# Patient Record
Sex: MALE | Race: WHITE
[De-identification: names, ages, dates, MRNs, and addresses within clinical notes are randomized per-mention and may not be internally consistent; named-entity substitution may affect disease eponyms.]

---

## 2018-03-19 ENCOUNTER — HOSPITAL ENCOUNTER (INPATIENT)
Dept: HOSPITAL 36 - ER | Age: 78
LOS: 2 days | Discharge: HOME | DRG: 312 | End: 2018-03-21
Attending: FAMILY MEDICINE | Admitting: FAMILY MEDICINE
Payer: COMMERCIAL

## 2018-03-19 VITALS — DIASTOLIC BLOOD PRESSURE: 84 MMHG | SYSTOLIC BLOOD PRESSURE: 156 MMHG

## 2018-03-19 DIAGNOSIS — I25.10: ICD-10-CM

## 2018-03-19 DIAGNOSIS — I10: ICD-10-CM

## 2018-03-19 DIAGNOSIS — Z95.0: ICD-10-CM

## 2018-03-19 DIAGNOSIS — F32.9: ICD-10-CM

## 2018-03-19 DIAGNOSIS — K21.9: ICD-10-CM

## 2018-03-19 DIAGNOSIS — E78.5: ICD-10-CM

## 2018-03-19 DIAGNOSIS — R42: ICD-10-CM

## 2018-03-19 DIAGNOSIS — D72.829: ICD-10-CM

## 2018-03-19 DIAGNOSIS — I25.119: ICD-10-CM

## 2018-03-19 DIAGNOSIS — R55: Primary | ICD-10-CM

## 2018-03-19 DIAGNOSIS — I25.2: ICD-10-CM

## 2018-03-19 LAB
ALBUMIN SERPL-MCNC: 4.6 GM/DL (ref 4.2–5.5)
ALBUMIN/GLOB SERPL: 2 {RATIO} (ref 1–1.8)
ALP SERPL-CCNC: 68 U/L (ref 34–104)
ALT SERPL-CCNC: 7 U/L (ref 7–52)
ANION GAP SERPL CALC-SCNC: 9.7 MMOL/L (ref 7–16)
AST SERPL-CCNC: 17 U/L (ref 13–39)
BILIRUB SERPL-MCNC: 0.6 MG/DL (ref 0.3–1)
BUN SERPL-MCNC: 18 MG/DL (ref 7–25)
CALCIUM SERPL-MCNC: 10.3 MG/DL (ref 8.6–10.3)
CHLORIDE SERPL-SCNC: 108 MEQ/L (ref 98–107)
CO2 SERPL-SCNC: 26.2 MEQ/L (ref 21–31)
CREAT SERPL-MCNC: 1 MG/DL (ref 0.7–1.3)
EOSINOPHIL NFR BLD: 0 % (ref 0–5)
ERYTHROCYTE [DISTWIDTH] IN BLOOD BY AUTOMATED COUNT: 12.5 % (ref 11.5–20)
GLOBULIN SER-MCNC: 2.3 GM/DL
GLUCOSE SERPL-MCNC: 90 MG/DL (ref 70–105)
HCT VFR BLD CALC: 46.3 % (ref 41–60)
HGB BLD-MCNC: 15.5 GM/DL (ref 12–16)
LYMPHOCYTES # BLD MANUAL: 26 % (ref 20–50)
MANUAL DIFFERENTIAL PERFORMED BLD QL: YES
MCH RBC QN AUTO: 28.5 PG (ref 27–31)
MCHC RBC AUTO-ENTMCNC: 33.4 PG (ref 28–36)
MCV RBC AUTO: 85.4 FL (ref 80–99)
MONOCYTES # BLD MANUAL: 2 % (ref 2–10)
NEUTROPHILS NFR BLD AUTO: 41 % (ref 40–80)
NEUTS BAND NFR BLD: 4 % (ref 0–10)
PLATELET # BLD: 159 TH/CMM (ref 150–400)
PMV BLD AUTO: 8.7 FL
POTASSIUM SERPL-SCNC: 3.9 MEQ/L (ref 3.5–5.1)
RBC # BLD AUTO: 5.42 MIL/CMM (ref 3.8–5.8)
SODIUM SERPL-SCNC: 140 MEQ/L (ref 136–145)
TOTAL CELLS COUNTED BLD: 100
VARIANT LYMPHS NFR BLD MANUAL: 27 %
WBC # BLD AUTO: 12.2 TH/CMM (ref 4.8–10.8)

## 2018-03-19 PROCEDURE — Z7610: HCPCS

## 2018-03-19 NOTE — ED PHYSICIAN CHART
ED Chief Complaint/HPI





- Patient Information


Date Seen:: 03/19/18


Time Seen:: 12:13


Chief Complaint:: EPISODE OF DIZZINESS APPROXIMATELY 45 MINUTES PTA.


History of Present Illness:: 





THE 77-YEAR-OLD MALE PRESENTS WITH A DIZZY EPISODE THAT OCCURRED APPROXIMATELY 

45 MINUTES PRIOR TO ARRIVAL WHILE HE WAS DRIVING FROM [employer].  He 

describes the episode as difficulty with alertness and describes it as feeling 

as though is fading in and out.  The patient has had a pacemaker for 2 years 

following a myocardial infarction and bradycardic episodes.  Patient has had no 

chest pain today, no diaphoresis and mild respiratory distress.  He denies any 

nausea, vomiting, diaphoresis, chest pain or abdominal pain.


Historian:: Patient


Review:: Nurse's Note Reviewed ( THE NURSES TRIAGE SHEET WAS THE SOURCE OF THE 

REVIEWED NURSING NOTES.)





ED Review of Systems





- Review of Systems


General/Constitutional: No fever, No chills, Weight loss, No weakness ( VAGUE 

WEAKNESS.), No diaphoresis


Skin: No skin lesions


Head: No headache, Light headed ( THE PATIENT EXPERIENCE A LIGHTHEADEDNESS THAT 

HE FOUND DIFFICULT TO DESCRIBE.)


Eyes: No pain, No diplopia


ENT: No earache, No nasal drainage


Neck: No neck pain, No swelling, No thyromegaly, No stiffness, No mass noted


Cardio Vascular: No chest pain, No palpitations, No PND, No orthopnea, No edema


Pulmonary: SOB ( THE PATIENT HAD MILD RESPIRATORY DISTRESS AT THE TIME HE WAS 

EXPERIENCING THE NEAR SYNCOPAL SYMPTOMS.), No cough, No sputum, No wheezing


GI: No nausea, No vomiting, No diarrhea, No pain, No melena, No hematochezia, 

No constipation, No hematemesis


G/U: No dysuria, No hematuria, No nacturia


Musculoskeletal: No bone or joint pain, No back pain, No muscle pain


Endocrine: No polyuria, No polydipsia


Psychiatric: Prior psych history, No depression, Suicidal ideation, No 

homicidal ideation, No visual hallucination


Hematopoietic: No bruising, No lymphadenopathy


Allergic/Immuno: Urticaria


Neurological: No syncope, No focal symptoms, No weakness, No paresthesia, No 

headache, No seizure, Dizziness, Confusion, No vertigo





Family Medical History





- Family Member


  ** Mother


History Unknown: Yes


Hx Family Tuberculosis: No ( ALL CAPS NO FAMILY HISTORY OF TUBERCULOSIS 

HEPATITIS OR COPD.)





ED Physical Exam





- Physical Examination


General/Constitutional: Well-developed, well-nourished, Alert, No distress, Non-

toxic appearing, Ambulatory


Head: Atraumatic


Eyes: Lids, conjuctiva normal, PERRL, EOMI


Other Eyes comments:: 





NO NYSTAGMUS


Skin: Nl inspection, No rash, No skin lesions, No ecchymosis, Well hydrated, No 

lymphadenopathy


ENMT: External ears, nose nl, Nasal exam nl, Lips, teeth, gums nl, Oropharynx nl


Neck: Nontender, Full ROM w/o pain, No JVD, No nuchal rigidity, No bruit, No 

mass, No stridor


Other Neck comments:: 





Patient has a pacemaker in the left subclavian area with no signs of infection.


Respiratory: Nl effort/Exclusion, Clear to Auscultation, No Wheeze/Rhonchi/

Rales (ON oxygen saturation is greater than 95% on room air.)


Cardio Vascular: RRR, No murmur, gallop, rubs, NL S1 S2


Other Cardio Vascular comments:: 





The external pulses in all 4 extremities. PULSES IN ALL FOUR EXTREMITIES WERE 

NORMAL.


GI: No organomegaly, No hernia, Normal BS's, Nondistended, No mass/bruits, No 

McBurney tenderness


Other GI comments:: 





 RECKLESS EXAMINATION DEFERRED AT MY DISCRETION.


: No CVA tenderness, NL external genitalia, No discharge


Extremities: No tenderness or effusion, Full ROM, normal strength in all 

extremities, No edema, Normal digits & nails


Neuro/Psych: Alert/oriented, Normal sensory exam, Judgement/insight normal, 

Mood normal, Normal gait, No focal deficits


Other Neuro/Psych comments:: 





 ALL CAPS EXCELLENT MOTOR STRENGTH IN ALL FOUR EXTREMITIES. PATIENT IS X 

 FOR [employer] AND [employer].


 Laboratory Tests











  03/19/18 03/19/18 03/19/18





  12:10 12:10 12:10


 


WBC  12.2 H  


 


RBC  5.42  


 


Hgb  15.5  


 


Hct  46.3  


 


MCV  85.4  


 


MCH  28.5  


 


MCHC Differential  33.4  


 


RDW  12.5  


 


Plt Count  159  


 


MPV  8.7  


 


Neutrophils %  NP  


 


Band Neutrophils %  4  


 


Lymphocytes %  NP  


 


Monocytes %  NP  


 


Eosinophils %  NP  


 


Basophils %  NP  


 


Neutrophils (Manual)  41  


 


Lymphocytes  26  


 


Monocytes  2  


 


Eosinophils  0  


 


Atypical Lymphocytes  27  


 


Sodium   140 


 


Potassium   3.9 


 


Chloride   108 H 


 


Carbon Dioxide   26.2 


 


Anion Gap   9.7 


 


BUN   18 


 


Creatinine   1.0 


 


Est GFR ( Amer)   TNP 


 


Est GFR (Non-Af Amer)   TNP 


 


BUN/Creatinine Ratio   18.0 


 


Glucose   90 


 


Hemoglobin A1c %   


 


Whole Bld Lactic Acid   


 


Calcium   10.3 


 


Total Bilirubin   0.6 


 


AST   17 


 


ALT   7 


 


Alkaline Phosphatase   68 


 


Troponin I    < 0.01 L


 


Total Protein   6.9 


 


Albumin   4.6 


 


Globulin   2.3 


 


Albumin/Globulin Ratio   2.0 H 


 


Triglycerides   


 


Cholesterol   


 


LDL Cholesterol Direct   


 


HDL Cholesterol   


 


TSH   














  03/19/18 03/20/18 03/20/18





  12:10 05:34 05:35


 


WBC    9.8


 


RBC    5.03


 


Hgb    14.3


 


Hct    43.1


 


MCV    85.6


 


MCH    28.5


 


MCHC Differential    33.2


 


RDW    12.5


 


Plt Count    142 L


 


MPV    8.9


 


Neutrophils %    31.0 L


 


Band Neutrophils %   


 


Lymphocytes %    59.4 H


 


Monocytes %    7.1


 


Eosinophils %    2.3


 


Basophils %    0.2


 


Neutrophils (Manual)   


 


Lymphocytes   


 


Monocytes   


 


Eosinophils   


 


Atypical Lymphocytes   


 


Sodium   


 


Potassium   


 


Chloride   


 


Carbon Dioxide   


 


Anion Gap   


 


BUN   


 


Creatinine   


 


Est GFR ( Amer)   


 


Est GFR (Non-Af Amer)   


 


BUN/Creatinine Ratio   


 


Glucose   


 


Hemoglobin A1c %   


 


Whole Bld Lactic Acid  1.22  


 


Calcium   


 


Total Bilirubin   


 


AST   


 


ALT   


 


Alkaline Phosphatase   


 


Troponin I   < 0.01 L 


 


Total Protein   


 


Albumin   


 


Globulin   


 


Albumin/Globulin Ratio   


 


Triglycerides   


 


Cholesterol   


 


LDL Cholesterol Direct   


 


HDL Cholesterol   


 


TSH   














  03/20/18 03/20/18 03/20/18





  05:35 05:35 05:35


 


WBC   


 


RBC   


 


Hgb   


 


Hct   


 


MCV   


 


MCH   


 


MCHC Differential   


 


RDW   


 


Plt Count   


 


MPV   


 


Neutrophils %   


 


Band Neutrophils %   


 


Lymphocytes %   


 


Monocytes %   


 


Eosinophils %   


 


Basophils %   


 


Neutrophils (Manual)   


 


Lymphocytes   


 


Monocytes   


 


Eosinophils   


 


Atypical Lymphocytes   


 


Sodium  139  


 


Potassium  4.0  


 


Chloride  110 H  


 


Carbon Dioxide  25.0  


 


Anion Gap  8.0  


 


BUN  19  


 


Creatinine  0.9  


 


Est GFR ( Amer)  TNP  


 


Est GFR (Non-Af Amer)  TNP  


 


BUN/Creatinine Ratio  21.1  


 


Glucose  102  


 


Hemoglobin A1c %   5.9 


 


Whole Bld Lactic Acid   


 


Calcium  9.0  


 


Total Bilirubin  0.7  


 


AST  12 L  


 


ALT  6 L  


 


Alkaline Phosphatase  59  


 


Troponin I   


 


Total Protein  5.9 L  


 


Albumin  3.8 L  


 


Globulin  2.1  


 


Albumin/Globulin Ratio  1.8  


 


Triglycerides  98  


 


Cholesterol  128  


 


LDL Cholesterol Direct  98  


 


HDL Cholesterol  29  


 


TSH    2.34








 ON LABORATORY STUDIES THE [facility] SHOW A MILD LEUKOCYTOSIS WHICH IS PROBABLY NOT 

CLINICALLY SIGNIFICANT. THE HEMOGLOBIN OF 15.5 WAS WITHIN NORMAL LIMITS.





ED Labs/Radiology/EKG Results





- Lab Results


Results: 





 SINGLE VIEW CHEST X-RAY SHOWS NO CARDIOMEGALY OR CHF. NO AREAS OF PULMONARY 

CONSOLIDATION OR THE FREIGHT. NO NEW MODE THORAX OR PLEURAL EFFUSION. IMPRESSION

: NO ACUTE CARDIOPULMONARY FINDINGS.





ED Assessment





- Assessment


General Assessment: 





CASE   SUMMARY:   THE 77-YEAR-OLD MALE PRESENTED WITH NEAR SYNCOPAL SYMPTOMS ON 

THE DRIVE HERE FROM [place]. PATIENT HAD A PAST HISTORY OF A PACEMAKER 

FOLLOWING MYOCARDIAL INFARCTION TWO YEARS AGO. A REPRESENTATIVE OF THE 

PACEMAKER COMPANY RESPONDED TO THE EMERGENCY DEPARTMENT AND QUERIED THE 

PACEMAKER. THE PATIENT WAS HOVERING IN THE HIGH 50S TO LOW 60S AND THE 

RECOMMENDATION WAS MADE TO INCREASE THE THRESHOLD FOR PACING THE 60. PATIENT 

HAD A NEGATIVE TROPONIN AND IT SEEM PRUDENT TO ADMIT THE PATIENT FOR FURTHER 

MONITORING AND STUDIES OVER THE NEXT 24 HOURS. HE WAS ADMITTED IN STABLE 

CONDITIONE





[facility]  NO SEVERE ANEMIA BASED ON LABORATORY STUDIES.  NOT

  HYPOGLYCEMIA BASED ON LABORATORY STUDIES.  NOT   3RD HEART BLOCK BASED ON 

CARDIOGENIC MONITORING IN THE EMERGENCY DEPARTMENT.





ED Septic Shock





- .


Is Septic Shock (SBP<90, OR Lactate>4 mmol\L) present?: No





ED Reassessment (Disposition)





- Reassessment


Reassessment Condition:: Improved





- Diagnosis


Diagnosis:: 





SYNCOPAL EPISODE 





- Aftercare/Follow up Instructions


Aftercare/Follow-Up Instructions:: Counseled pt regarding lab results/diagnosis 

& need follow up





ED Discharge Plan





- Patient Disposition


Admit/Discharge/Transfer: Acute Care w/in this hosp


Condition at Disposition: Stable

## 2018-03-20 LAB
ALBUMIN SERPL-MCNC: 3.8 GM/DL (ref 4.2–5.5)
ALBUMIN/GLOB SERPL: 1.8 {RATIO} (ref 1–1.8)
ALP SERPL-CCNC: 59 U/L (ref 34–104)
ALT SERPL-CCNC: 6 U/L (ref 7–52)
ANION GAP SERPL CALC-SCNC: 8 MMOL/L (ref 7–16)
AST SERPL-CCNC: 12 U/L (ref 13–39)
BASOPHILS # BLD AUTO: 0 TH/CUMM (ref 0–0.2)
BASOPHILS NFR BLD AUTO: 0.2 % (ref 0–2)
BILIRUB SERPL-MCNC: 0.7 MG/DL (ref 0.3–1)
BUN SERPL-MCNC: 19 MG/DL (ref 7–25)
CALCIUM SERPL-MCNC: 9 MG/DL (ref 8.6–10.3)
CHLORIDE SERPL-SCNC: 110 MEQ/L (ref 98–107)
CHOLEST SERPL-MCNC: 128 MG/DL (ref ?–200)
CO2 SERPL-SCNC: 25 MEQ/L (ref 21–31)
CREAT SERPL-MCNC: 0.9 MG/DL (ref 0.7–1.3)
EOSINOPHIL # BLD AUTO: 0.2 TH/CMM (ref 0.1–0.4)
EOSINOPHIL NFR BLD AUTO: 2.3 % (ref 0–5)
ERYTHROCYTE [DISTWIDTH] IN BLOOD BY AUTOMATED COUNT: 12.5 % (ref 11.5–20)
GLOBULIN SER-MCNC: 2.1 GM/DL
GLUCOSE SERPL-MCNC: 102 MG/DL (ref 70–105)
HBA1C MFR BLD: 5.9 % (ref 4–6)
HCT VFR BLD CALC: 43.1 % (ref 41–60)
HDLC SERPL-MCNC: 29 MG/DL (ref 23–92)
HGB BLD-MCNC: 14.3 GM/DL (ref 12–16)
HGB BLD-MCNC: 16 G/DL (ref 4–35)
LYMPHOCYTE AB SER FC-ACNC: 5.9 TH/CMM (ref 1.5–3)
LYMPHOCYTES NFR BLD AUTO: 59.4 % (ref 20–50)
MCH RBC QN AUTO: 28.5 PG (ref 27–31)
MCHC RBC AUTO-ENTMCNC: 33.2 PG (ref 28–36)
MCV RBC AUTO: 85.6 FL (ref 80–99)
MONOCYTES # BLD AUTO: 0.7 TH/CMM (ref 0.3–1)
MONOCYTES NFR BLD AUTO: 7.1 % (ref 2–10)
NEUTROPHILS # BLD: 3 TH/CMM (ref 1.8–8)
NEUTROPHILS NFR BLD AUTO: 31 % (ref 40–80)
PLATELET # BLD: 142 TH/CMM (ref 150–400)
PMV BLD AUTO: 8.9 FL
POTASSIUM SERPL-SCNC: 4 MEQ/L (ref 3.5–5.1)
RBC # BLD AUTO: 5.03 MIL/CMM (ref 3.8–5.8)
SODIUM SERPL-SCNC: 139 MEQ/L (ref 136–145)
TRIGL SERPL-MCNC: 98 MG/DL (ref ?–150)
WBC # BLD AUTO: 9.8 TH/CMM (ref 4.8–10.8)

## 2018-03-20 RX ADMIN — PANTOPRAZOLE SODIUM SCH MG: 40 TABLET, DELAYED RELEASE ORAL at 08:54

## 2018-03-20 NOTE — DIAGNOSTIC IMAGING REPORT
Carotid ultrasound



HISTORY: Syncope



COMPARISON: None



Technique: Longitudinal and transverse sonographic sector images of the

carotid arteries were obtained with doppler analysis.



FINDINGS:



Exam of the right side demonstrates generalized atherosclerotic vascular

disease with mild to moderate plaque formation, greatest along the

carotid bulb.



Exam of left side demonstrates generalized atherosclerotic vascular

disease with mild to moderate atherosclerotic plaque formation.



The velocity and velocity ratios are within normal limits.  Antegrade

vertebral artery flow is demonstrated bilaterally.



IMPRESSION:



Mild to moderate bilateral atherosclerotic vascular disease, right

greater than left.  No evidence of hemodynamically significant stenosis.

## 2018-03-20 NOTE — CONSULTATION
DATE OF CONSULTATION:  03/20/2018



The patient of Dr. Ian Eldridge.



HISTORY OF PRESENT ILLNESS:  This is a 77-year-old male patient who was

traveling from [place] to [place].  The patient was near [place] when

the patient had been feeling dizziness, near syncope, weakness, and the patient

came to the Emergency Room and the patient is admitted.  No history of PND or

orthopnea.



PAST MEDICAL HISTORY:  Sick sinus syndrome with pacemaker, old myocardial

infarction, stable angina, hypertension, hyperlipidemia, GERD, and depression.



FAMILY HISTORY:  Unremarkable.



SOCIAL HISTORY:  No history of smoking, alcohol abuse.



ALLERGIES:  No known allergies.



PHYSICAL EXAMINATION:

VITAL SIGNS:  Blood pressure 130/80, pulse 70, and respirations 20.

HEAD:  Normocephalic.  No lumps or bumps.

EYES:  Pupils equal, reactive to light.  Fundi show AV nicking, sclerae white,

conjunctivae pink.

NECK:  Carotid 2+.  Normal upstroke.  JVD flat.  Thyroid not palpable.  Lymph

nodes not palpable.

CHEST:  Shows increased AP diameter.  No kyphosis, scoliosis.

LUNGS:  Bilateral bronchovesicular breath sounds.

HEART:  PMI fifth intercostal space with lateral to midclavicular line.  S1, S2.

 No S3, soft S4, soft systolic murmur.

ABDOMEN:  Soft.  Liver and spleen not palpable.  No organomegaly.  Bowel sounds

active.

NEUROLOGIC:  Unremarkable.

EXTREMITIES:  Peripheral pulses 2+.  No pedal edema.



CLINICAL IMPRESSION:  Near syncope, sick sinus syndrome with pacemaker, old

myocardial infarction, stable angina, hypertension, hyperlipidemia,

gastroesophageal reflux disease, and depression.



PLAN:  We will get EKG, troponin level, carotid duplex study, and

echocardiogram.





DD: 03/20/2018 20:35

DT: 03/20/2018 23:23

JOB# [id number]  [id number]

## 2018-03-20 NOTE — HISTORY AND PHYSICAL
History of Present Illness





- HPI


Chief Complaint: 





Episodes of dizziness, near-syncopal episode


HPI: 





76y/o male who presents to [facility] for an episode of 

feeling dizzy while driving from [place]. He describes the episode with 

difficulty remaining alert and the feeling of fading in and out of 

consciousness. Patient recently underwent placement of a cardiac pacemaker 2 

years ago following a myocardial infarction and bradycardic episode. Patient 

has had no chest pain or no diaphoresis or mild respiratory distress. He denies 

nausea or vomiting. 





Initial Labwork





WBC 12.2 H/H 15.5/46.5 platelet 159


Na 140 K3.9 Bun/Cr 18/1.0 Glu 90


troponin <0.01





Patient was subsequently admitted to a telemetry bed for further evaluation and 

treatment. 


Vital Signs: 





 Last Vital Signs











Temp  97.2 F   03/20/18 04:00


 


Pulse  72   03/20/18 04:00


 


Resp  18   03/20/18 04:00


 


BP  145/87   03/20/18 04:00


 


Pulse Ox  95   03/20/18 04:00














Past Medical History


Cardiovascular: Report: HTN, Hyperlipidemia, Other (h/o MI)


Pulmonary: Report: No Pertinent Hx


CNS: Report: No Pertinent Hx


GI: Report: GERD


Psych: Report: Depression


Musculoskeletal: Report: No Pertinent Hx


Rheumatologic: Report: No pertinent Hx


Infectious Disease: Report: No Pertinent Hx


Renal/: Report: No Pertinent Hx


Endocrine: Report: No Pertinent Hx


Dermatology: Report: No Pertinent Hx





- Past Surgical History


Past Surgical History: Other (s/p pacemaker placement)





Family Medical History





- Family Member


  ** Mother


History Unknown: Yes





Social History


Smoke: No


Alcohol: None


Drugs: None


Lives: With Family





- Medications


Home Medications: 


Home Medication











 Medication  Instructions  Recorded  Type


 


Losartan Potassium [Cozaar] 25 mg PO DAILY 03/19/18 History


 


Lovastatin 20 mg PO DAILY 03/19/18 History


 


Pantoprazole [Protonix] 40 mg PO DAILY 03/19/18 History


 


Sertraline [Zoloft] 25 mg PO DAILY 03/19/18 History














- Allergies


Allergies/Adverse Reactions: 


 Allergies











Allergy/AdvReac Type Severity Reaction Status Date / Time


 


amoxicillin Allergy   Verified 03/19/18 12:49


 


clavulanic acid Allergy   Verified 03/19/18 12:49





[From Augmentin]     














Review of Systems





- Review of Systems


Constitutional: Report: No Significant


Eyes: Report: No Significant


ENT: Report: No Significant


Respiratory: Report: No Significant


Cardiovascular: Report: No Significant


Gastrointestinal: Report: No Significant


Genitourinary: Report: No Significant


Musculoskeletal: Report: No Significant


Skin: Report: No Significant


Neurological: Report: No Significant





Physical Exam





- Physical Exam


HEENT: Report: Ears Nose Throat within normal limits, Pharnyx within normal 

limits


Neck: Report: Within normal limits


Cardiovascular Systems: Report: +s1/s2 noted, Regular, Rate and Rhythm


Respiratory: Report: Breath Sounds are within normal limits, Clear to 

Auscultation of lung fields


Abdomen: Report: Non-tender to palpation


Back: Report: Inspection of back is within normal limits.


Extremities: Report: Non-tender to palpation.


Skin: Report: Color of skin is within normal limits


Neuro/Psych: Report: Mood affect is within normal limits, A+Ox3





- Assessment


Assessment: 





 Current Active Problems











Problem Status Onset


 


DIZZINESS WITH PACEMAKER Acute  








Dizziness


Near Syncopal Episode


Coronary artery disease


Leukocytosis


H/O Myocardial Infacartion


Hypertension


GERD


Hyperlipidemia


Depression








- Plan


Plan: 





Admit to telemetry


cardiac consult -- Dr. Medina


CBC,CMP,lipid profile, TSH, HgA1c, troponin


carotid US


continue home meds

## 2018-03-21 RX ADMIN — PANTOPRAZOLE SODIUM SCH MG: 40 TABLET, DELAYED RELEASE ORAL at 08:41

## 2018-03-21 NOTE — CARDIOLOGY
03/20/2018



ECHOCARDIOGRAM REPORT



M-MODE ECHOCARDIOGRAM:  Mitral valve:  Anterior leaflet of mitral valve shows

normal excursion, EF velocity.  Posterior leaflet of the mitral valve shows

normal excursion.  Left ventricular posterior wall shows increased thickness,

normal excursion.  Interventricular septum shows increased thickness, normal

excursion, hypertrophy of the left ventricle, ejection fraction 60%.  Left

atrium normal.  Aortic root shows normal dimension.  Normal excursion of aortic

leaflets.



CONCLUSION:  Hypertrophy of the left ventricle, ejection fraction 60%.



TWO-D ECHO:  Long axis view showed normal sized left ventricle with hypertrophy

of the left ventricle.  Left atrium normal.  Aortic root shows normal dimension.

 Normal excursion of aortic leaflets.  Short axis mitral valve normal.  Short

axis view of aortic valve normal.  Apical four chamber view showed normal sized

left ventricle with hypertrophy of the left ventricle.  Left atrium normal. 

Right ventricular cavity, right atrium normal.  No pericardial effusion.



CONCLUSION:  Hypertrophy of the left ventricle, ejection fraction 60%.



Doppler study shows prominent A wave consistent with poor compliance of left

ventricle, mild tricuspid regurgitation, right ventricular systolic pressure 40

mmHg.





DD: 03/21/2018 16:27

DT: 03/21/2018 17:59

[facility]# [id number]  [id number]

## 2018-03-21 NOTE — GENERAL PROGRESS NOTE
Subjective





- Review of Systems


Service Date: 03/21/18


Subjective: 





No chest pain, no SOB, no dizziness, no events. Was seen yesterday by 

cardiology and ECHO was done. 





Objective





- Results


Result Diagrams: 


 03/20/18 05:35





 03/20/18 05:35


Recent Labs: 


 Laboratory Last Values











WBC  9.8 Th/cmm (4.8-10.8)   03/20/18  05:35    


 


RBC  5.03 Mil/cmm (3.80-5.80)   03/20/18  05:35    


 


Hgb  14.3 gm/dL (12-16)   03/20/18  05:35    


 


Hct  43.1 % (41.0-60)   03/20/18  05:35    


 


MCV  85.6 fl (80-99)   03/20/18  05:35    


 


MCH  28.5 pg (27.0-31.0)   03/20/18  05:35    


 


MCHC Differential  33.2 pg (28.0-36.0)   03/20/18  05:35    


 


RDW  12.5 % (11.5-20.0)   03/20/18  05:35    


 


Plt Count  142 Th/cmm (150-400)  L  03/20/18  05:35    


 


MPV  8.9 fl  03/20/18  05:35    


 


Neutrophils %  31.0 % (40.0-80.0)  L  03/20/18  05:35    


 


Band Neutrophils %  4 % (0-10)   03/19/18  12:10    


 


Lymphocytes %  59.4 % (20.0-50.0)  H  03/20/18  05:35    


 


Monocytes %  7.1 % (2.0-10.0)   03/20/18  05:35    


 


Eosinophils %  2.3 % (0.0-5.0)   03/20/18  05:35    


 


Basophils %  0.2 % (0.0-2.0)   03/20/18  05:35    


 


Neutrophils (Manual)  41 % (40-80)   03/19/18  12:10    


 


Lymphocytes  26 % (20-50)   03/19/18  12:10    


 


Monocytes  2 % (2-10)   03/19/18  12:10    


 


Eosinophils  0 % (0-5)   03/19/18  12:10    


 


Atypical Lymphocytes  27 %  03/19/18  12:10    


 


Sodium  139 mEq/L (136-145)   03/20/18  05:35    


 


Potassium  4.0 mEq/L (3.5-5.1)   03/20/18  05:35    


 


Chloride  110 mEq/L ()  H  03/20/18  05:35    


 


Carbon Dioxide  25.0 mEq/L (21.0-31.0)   03/20/18  05:35    


 


Anion Gap  8.0  (7.0-16.0)   03/20/18  05:35    


 


BUN  19 mg/dL (7-25)   03/20/18  05:35    


 


Creatinine  0.9 mg/dL (0.7-1.3)   03/20/18  05:35    


 


Est GFR ( Amer)  TNP   03/20/18  05:35    


 


Est GFR (Non-Af Amer)  TNP   03/20/18  05:35    


 


BUN/Creatinine Ratio  21.1   03/20/18  05:35    


 


Glucose  102 mg/dL ()   03/20/18  05:35    


 


Hemoglobin A1c %  5.9 % (4.0-6.0)   03/20/18  05:35    


 


Whole Bld Lactic Acid  1.22 mmol/L (0.60-1.99)   03/19/18  12:10    


 


Calcium  9.0 mg/dL (8.6-10.3)   03/20/18  05:35    


 


Total Bilirubin  0.7 mg/dL (0.3-1.0)   03/20/18  05:35    


 


AST  12 U/L (13-39)  L  03/20/18  05:35    


 


ALT  6 U/L (7-52)  L  03/20/18  05:35    


 


Alkaline Phosphatase  59 U/L ()   03/20/18  05:35    


 


Troponin I  < 0.01 ng/mL (0.01-0.05)  L  03/20/18  05:34    


 


Total Protein  5.9 gm/dL (6.0-8.3)  L  03/20/18  05:35    


 


Albumin  3.8 gm/dL (4.2-5.5)  L  03/20/18  05:35    


 


Globulin  2.1 gm/dL  03/20/18  05:35    


 


Albumin/Globulin Ratio  1.8  (1.0-1.8)   03/20/18  05:35    


 


Triglycerides  98 mg/dL (<150)   03/20/18  05:35    


 


Cholesterol  128 mg/dL (<200)   03/20/18  05:35    


 


LDL Cholesterol Direct  98 mg/dL ()   03/20/18  05:35    


 


HDL Cholesterol  29 mg/dL (23-92)   03/20/18  05:35    


 


TSH  2.34 uIU/ml (0.34-5.60)   03/20/18  05:35    














- Physical Exam


Vitals and I&O: 


 Vital Signs











Temp  97.4 F   03/21/18 03:55


 


Pulse  67   03/21/18 03:55


 


Resp  18   03/21/18 03:55


 


BP  139/77   03/21/18 03:55


 


Pulse Ox  95   03/21/18 03:55








 Intake & Output











 03/20/18 03/21/18 03/21/18





 18:59 06:59 18:59


 


Intake Total 600  


 


Balance 600  


 


Weight (lbs) 78.063 kg 78.471 kg 


 


Intake:   


 


  Oral 600  


 


Other:   


 


  # Voids 3  


 


  # Bowel Movements 1  











Active Medications: 


Current Medications





Losartan Potassium (Cozaar)  50 mg PO DAILY [facility]


   Stop: 05/19/18 08:59


   Last Admin: 03/20/18 08:54 Dose:  50 mg


Pantoprazole Sodium (Protonix)  40 mg PO DAILY [facility]


   Stop: 05/19/18 08:59


   Last Admin: 03/20/18 08:54 Dose:  40 mg


Sertraline HCl (Zoloft)  25 mg PO DAILY [facility]


   PRN Reason: Protocol


   Stop: 05/19/18 08:59


   Last Admin: 03/20/18 10:05 Dose:  25 mg


Simvastatin (Zocor)  10 mg PO [facility]


   Stop: 05/19/18 20:59


   Last Admin: 03/20/18 20:54 Dose:  10 mg








General: Alert, Oriented x3


HEENT: Atraumatic, PERRLA


Neck: Supple, no JVD, no Thyromegaly


Cardiovascular: Regular rate, Normal S1, Normal S2


Lungs: Clear to auscultation


Abdomen: Bowel sounds, Soft


Extremities: no Clubbing, no Cyanosis, no Edema


Neurological: Normal gait, Normal speech





Assessment/Plan





- Problem List


Patient Problems: 


All Active Problems





DIZZINESS WITH PACEMAKER (Acute) 











- Assessment


Assessment: 





 Current Active Problems











Problem Status Onset


 


DIZZINESS WITH PACEMAKER Acute  








Dizziness


Near Syncopal Episode


Coronary artery disease


Leukocytosis


H/O Myocardial Infacartion


Hypertension


GERD


Hyperlipidemia


Depression








- Plan


Plan: 





May be discharge home IF OK WITH CARDIOLOGY


patient to continue current medications


advised to stop smoking


follow up primary care physician in [place].

## 2018-03-23 NOTE — DISCHARGE SUMMARY
DATE OF DISCHARGE:  03/21/2018



PRELIMINARY DIAGNOSES:  

1.  Dizziness.

2.  Near syncopal episode.

3.  Coronary artery disease.

4.  Leukocytosis.

5.  History of myocardial infarction.

6.  Hypertension.

7.  Gastroesophageal reflux disease.

8.  Hyperlipidemia.

9.  Depression.



DISCHARGE DIAGNOSES:  

1.  Dizziness, now resolved.

2.  Near syncopal episode.

3.  Coronary artery disease.

4.  Leukocytosis, now resolved.

5.  History of myocardial infarction.

6.  Hypertension.

7.  Gastroesophageal reflux disease.

8.  Hyperlipidemia.

9.  Depression.



BRIEF HISTORY OF PRESENT ILLNESS:  This is a 77-year-old male who presents to

[facility] ER for an episode of feeling dizzy while driving

from [place].  He described the episode of difficulty remaining alert and

feeling a fading in and out of consciousness.  The patient recently underwent

pacemaker placement about 2 years ago following an MI and bradycardic episodes. 

The patient denies any chest pain or shortness of breath or diaphoresis.  He

denies any nausea or vomiting.  The patient was then subsequently admitted to

telemetry bed for further evaluation and treatment.



HOSPITAL COURSE:  The patient was seen and evaluated by Cardiology.  See

dictated report.  The patient also underwent an echocardiogram.  Please see

dictated report.  The patient had no further episodes of dizziness, was kept on

telemetry.  No significant events occurred during his stay.  He was subsequently

discharged in stable condition to follow up with his primary care physician.



The patient is to continue his current medications.





DD: 03/23/2018 08:28

DT: 03/23/2018 11:51

[facility]# [id number]  [id number]